# Patient Record
Sex: FEMALE | Race: WHITE | NOT HISPANIC OR LATINO | Employment: OTHER | URBAN - METROPOLITAN AREA
[De-identification: names, ages, dates, MRNs, and addresses within clinical notes are randomized per-mention and may not be internally consistent; named-entity substitution may affect disease eponyms.]

---

## 2022-08-30 ENCOUNTER — OFFICE VISIT (OUTPATIENT)
Dept: URGENT CARE | Facility: CLINIC | Age: 72
End: 2022-08-30
Payer: MEDICARE

## 2022-08-30 VITALS
BODY MASS INDEX: 24.48 KG/M2 | DIASTOLIC BLOOD PRESSURE: 70 MMHG | SYSTOLIC BLOOD PRESSURE: 148 MMHG | TEMPERATURE: 98 F | WEIGHT: 133 LBS | OXYGEN SATURATION: 99 % | HEIGHT: 62 IN | HEART RATE: 58 BPM | RESPIRATION RATE: 16 BRPM

## 2022-08-30 DIAGNOSIS — R30.0 DYSURIA: ICD-10-CM

## 2022-08-30 DIAGNOSIS — N39.0 URINARY TRACT INFECTION WITH HEMATURIA, SITE UNSPECIFIED: Primary | ICD-10-CM

## 2022-08-30 DIAGNOSIS — R31.9 URINARY TRACT INFECTION WITH HEMATURIA, SITE UNSPECIFIED: Primary | ICD-10-CM

## 2022-08-30 LAB
BILIRUB UR QL STRIP: NEGATIVE
GLUCOSE UR QL STRIP: NEGATIVE
KETONES UR QL STRIP: NEGATIVE
LEUKOCYTE ESTERASE UR QL STRIP: POSITIVE
PH, POC UA: 7 (ref 5–8)
POC BLOOD, URINE: POSITIVE
POC NITRATES, URINE: NEGATIVE
PROT UR QL STRIP: NEGATIVE
SP GR UR STRIP: 1.01 (ref 1–1.03)
UROBILINOGEN UR STRIP-ACNC: NORMAL (ref 0.1–1.1)

## 2022-08-30 PROCEDURE — 87086 URINE CULTURE/COLONY COUNT: CPT | Performed by: PHYSICIAN ASSISTANT

## 2022-08-30 PROCEDURE — 99203 PR OFFICE/OUTPT VISIT, NEW, LEVL III, 30-44 MIN: ICD-10-PCS | Mod: S$GLB,,, | Performed by: PHYSICIAN ASSISTANT

## 2022-08-30 PROCEDURE — 81003 POCT URINALYSIS, DIPSTICK, AUTOMATED, W/O SCOPE: ICD-10-PCS | Mod: QW,S$GLB,, | Performed by: PHYSICIAN ASSISTANT

## 2022-08-30 PROCEDURE — 81003 URINALYSIS AUTO W/O SCOPE: CPT | Mod: QW,S$GLB,, | Performed by: PHYSICIAN ASSISTANT

## 2022-08-30 PROCEDURE — 99203 OFFICE O/P NEW LOW 30 MIN: CPT | Mod: S$GLB,,, | Performed by: PHYSICIAN ASSISTANT

## 2022-08-30 RX ORDER — AMLODIPINE BESYLATE 2.5 MG/1
TABLET ORAL
COMMUNITY
Start: 2021-12-22

## 2022-08-30 RX ORDER — NITROFURANTOIN 25; 75 MG/1; MG/1
100 CAPSULE ORAL 2 TIMES DAILY
Qty: 10 CAPSULE | Refills: 0 | Status: SHIPPED | OUTPATIENT
Start: 2022-08-30 | End: 2022-09-04

## 2022-08-30 RX ORDER — FENOFIBRATE 145 MG/1
TABLET, FILM COATED ORAL
COMMUNITY
Start: 2021-11-22

## 2022-08-30 RX ORDER — LACTULOSE 10 G/15ML
SOLUTION ORAL; RECTAL
COMMUNITY
Start: 2022-08-25

## 2022-08-30 RX ORDER — ESTRADIOL 0.1 MG/G
1 CREAM VAGINAL
COMMUNITY
Start: 2022-05-23

## 2022-08-30 RX ORDER — EZETIMIBE 10 MG/1
TABLET ORAL
COMMUNITY
Start: 2021-11-22

## 2022-08-30 NOTE — PROGRESS NOTES
"Subjective:       Patient ID: Patti Cardenas is a 72 y.o. female.    Vitals:  height is 5' 2" (1.575 m) and weight is 60.3 kg (133 lb). Her oral temperature is 97.7 °F (36.5 °C). Her blood pressure is 148/70 (abnormal) and her pulse is 58 (abnormal). Her respiration is 16 and oxygen saturation is 99%.     Chief Complaint: Dysuria    Pt presents today with urgency, pain when urinating, lowered abdomen pain x 1.5 weeks. Symptoms come and go. OTC taken with mild relief and pain scale 6/10.    Dysuria   This is a new problem. The current episode started 1 to 4 weeks ago. The problem occurs intermittently. The problem has been gradually worsening. The quality of the pain is described as aching and stabbing. The pain is at a severity of 6/10. The pain is moderate. There has been no fever. She is Not sexually active. There is No history of pyelonephritis. Associated symptoms include frequency and urgency. Pertinent negatives include no chills, flank pain, nausea or vomiting. She has tried acetaminophen for the symptoms. The treatment provided mild relief.     Constitution: Negative for chills and fever.   Gastrointestinal:  Negative for nausea and vomiting.   Genitourinary:  Positive for dysuria, frequency and urgency. Negative for flank pain.     Objective:      Physical Exam   Constitutional: She does not appear ill. No distress.   HENT:   Head: Normocephalic and atraumatic.   Ears:   Right Ear: External ear normal.   Left Ear: External ear normal.   Eyes: Conjunctivae are normal. Right eye exhibits no discharge. Left eye exhibits no discharge. Extraocular movement intact   Cardiovascular: Normal rate, regular rhythm and normal heart sounds.   No murmur heard.  Pulmonary/Chest: Effort normal. No respiratory distress.   Abdominal: Normal appearance.   Musculoskeletal: Normal range of motion.         General: Normal range of motion.   Neurological: no focal deficit. She is alert.   Skin: Skin is warm, dry and not pale. " "jaundice  Psychiatric: Her behavior is normal. Mood, judgment and thought content normal.   Nursing note and vitals reviewed.      Assessment:       1. Urinary tract infection with hematuria, site unspecified    2. Dysuria            Plan:         Urinary tract infection with hematuria, site unspecified  -     CULTURE, URINE    Dysuria  -     POCT Urinalysis, Dipstick, Automated, W/O Scope    Results for orders placed or performed in visit on 08/30/22   POCT Urinalysis, Dipstick, Automated, W/O Scope   Result Value Ref Range    POC Blood, Urine Positive (A) Negative    POC Bilirubin, Urine Negative Negative    POC Urobilinogen, Urine normal 0.1 - 1.1    POC Ketones, Urine Negative Negative    POC Protein, Urine Negative Negative    POC Nitrates, Urine Negative Negative    POC Glucose, Urine Negative Negative    pH, UA 7.0 5 - 8    POC Specific Gravity, Urine 1.010 1.003 - 1.029    POC Leukocytes, Urine Positive (A) Negative        Other orders  -     nitrofurantoin, macrocrystal-monohydrate, (MACROBID) 100 MG capsule; Take 1 capsule (100 mg total) by mouth 2 (two) times daily. for 5 days  Dispense: 10 capsule; Refill: 0       Urinary Tract Infections in Adults   The Basics   Written by the doctors and editors at [facility]   What is the urinary tract? -- The urinary tract is the group of organs in the body that handle urine (figure 1). The urinary tract includes the:  Kidneys, 2 bean-shaped organs that filter the blood to make urine  Bladder, a balloon-shaped organ that stores urine  Ureters, 2 tubes that carry urine from the kidneys to the bladder  Urethra, the tube that carries urine from the bladder to the outside of the body  What are urinary tract infections? -- Urinary tract infections, also called "UTIs," are infections that affect either the bladder or the kidneys:  Bladder infections are more common than kidney infections. They happen when bacteria get into the urethra and travel up into the bladder. The " "medical term for bladder infection is "cystitis."  Kidney infections happen when the bacteria travel even higher, up into the kidneys. The medical term for kidney infection is "pyelonephritis."   Both bladder and kidney infections are more common in women than men.  What are the symptoms of a bladder infection? -- The symptoms include:  Pain or a burning feeling when you urinate  The need to urinate often  The need to urinate suddenly or in a hurry  Blood in the urine  What are the symptoms of a kidney infection? -- The symptoms of a kidney infection can include the symptoms of a bladder infection, but kidney infections can also cause:  Fever  Back pain  Nausea or vomiting  How do I find out if I have a urinary tract infection? -- Call your doctor or nurse. Sometimes, he or she can tell if you have a urinary tract infection just by learning about your symptoms.  Your doctor or nurse might do a simple urine test. If he or she thinks you might have a kidney infection or is unsure what you have, he or she might also do a more involved urine test to check for bacteria.  How are urinary tract infections treated? -- Most urinary tract infections are treated with antibiotic pills. These pills work by killing the germs that cause the infection.  If you have a bladder infection, you will probably need to take antibiotics for 3 to 7 days. If you have a kidney infection, you will probably need to take antibiotics for longer - maybe for up to 2 weeks. If you have a kidney infection, it's also possible you will need to be treated in the hospital.  Your symptoms should begin to improve within a day of starting antibiotics. But you should finish all the antibiotic pills you get. Otherwise your infection might come back.  If needed, you can also take a medicine to numb your bladder. This medicine eases the pain caused by urinary tract infections. It also reduces the need to urinate.  What if I get bladder infections a lot? -- " First, check with your doctor or nurse to make sure that you are really having bladder infections. The symptoms of bladder infection can be caused by other things. Your doctor or nurse will want to see if those problems might be causing your symptoms.  But if you are really dealing with repeated infections, there are things you can do to keep from getting more infections. These include:  Avoiding spermicides (sperm-killing creams) - Spermicide is a form of birth control. It seems to increase the risk of bladder infections in some women, especially when used with a diaphragm. If you use spermicide and get a lot of bladder infections, you might want to try switching to a different form of birth control.  Drinking more fluid - This can help prevent bladder infections.  Urinating right after sex - Some doctors think this helps, because it helps flush out germs that might get into the bladder during sex. There is no proof it works, but it also cannot hurt.  Vaginal estrogen - If you are a woman who has already been through menopause, your doctor might suggest this. Vaginal estrogen comes in a cream or a flexible ring that you put into your vagina. It can help prevent bladder infections.  Antibiotics - If you get a lot of bladder infections, and the above methods have not helped, your doctor might give you antibiotics to help prevent infection. But taking antibiotics has downsides, so doctors usually suggest trying other things first.  Can cranberry juice or other cranberry products prevent bladder infections? -- The studies suggesting that cranberry products prevent bladder infections are not very good. Other studies suggest that cranberry products do not prevent bladder infections. But if you want to try cranberry products for this purpose, there is probably not much harm in doing so.  All topics are updated as new evidence becomes available and our peer review process is complete.  This topic retrieved from [facility] [facility]  on: Sep 21, 2021.  Topic 01732 Version 12.0  Release: 29.4.2 - C29.263  © 2021 Up[employer]Date, Inc. and/or its affiliates. All rights reserved.  figure 1: Anatomy of the urinary tract     Urine is made by the kidneys. It passes from the kidneys into the bladder through two tubes called the ureters. Then it leaves the bladder through another tube called the urethra.  Graphic 74139 Version 7.0     Consumer Information Use and Disclaimer   This information is not specific medical advice and does not replace information you receive from your health care provider. This is only a brief summary of general information. It does NOT include all information about conditions, illnesses, injuries, tests, procedures, treatments, therapies, discharge instructions or life-style choices that may apply to you. You must talk with your health care provider for complete information about your health and treatment options. This information should not be used to decide whether or not to accept your health care provider's advice, instructions or recommendations. Only your health care provider has the knowledge and training to provide advice that is right for you. The use of this information is governed by the [employer] End User License Agreement, available at https://[email or web address].Mobile [email or web address] use of [employer] content is governed by the [employer] Terms of Use. ©2021 [employer]Date, Inc. All rights reserved.  Copyright   © 2021 [employer], Inc. and/or its affiliates. All rights reserved.

## 2022-08-31 LAB
BACTERIA UR CULT: NORMAL
BACTERIA UR CULT: NORMAL

## 2022-09-01 ENCOUNTER — TELEPHONE (OUTPATIENT)
Dept: URGENT CARE | Facility: CLINIC | Age: 72
End: 2022-09-01
Payer: OTHER GOVERNMENT

## 2022-09-01 NOTE — TELEPHONE ENCOUNTER
Discussed urine culture results with patient. She is feeling better. No repeat culture needed at this time.